# Patient Record
Sex: FEMALE | Race: WHITE | NOT HISPANIC OR LATINO | Employment: OTHER | URBAN - METROPOLITAN AREA
[De-identification: names, ages, dates, MRNs, and addresses within clinical notes are randomized per-mention and may not be internally consistent; named-entity substitution may affect disease eponyms.]

---

## 2017-01-12 ENCOUNTER — GENERIC CONVERSION - ENCOUNTER (OUTPATIENT)
Dept: OTHER | Facility: OTHER | Age: 62
End: 2017-01-12

## 2017-01-23 ENCOUNTER — GENERIC CONVERSION - ENCOUNTER (OUTPATIENT)
Dept: OTHER | Facility: OTHER | Age: 62
End: 2017-01-23

## 2017-02-24 ENCOUNTER — ALLSCRIPTS OFFICE VISIT (OUTPATIENT)
Dept: OTHER | Facility: OTHER | Age: 62
End: 2017-02-24

## 2017-05-16 ENCOUNTER — GENERIC CONVERSION - ENCOUNTER (OUTPATIENT)
Dept: OTHER | Facility: OTHER | Age: 62
End: 2017-05-16

## 2017-05-16 ENCOUNTER — ALLSCRIPTS OFFICE VISIT (OUTPATIENT)
Dept: OTHER | Facility: OTHER | Age: 62
End: 2017-05-16

## 2017-05-19 LAB
ADEQUACY: (HISTORICAL): NORMAL
CLINICIAN PROVIDIED ICD 9 OR 10 (HISTORICAL): NORMAL
COMMENT (HISTORICAL): NORMAL
DIAGNOSIS (HISTORICAL): NORMAL
HPV HIGH RISK (HISTORICAL): NEGATIVE
NOTE: (HISTORICAL): NORMAL
PERFORMED BY (HISTORICAL): NORMAL

## 2017-06-03 LAB
A/G RATIO (HISTORICAL): 1.9 (ref 1.2–2.2)
ALBUMIN SERPL BCP-MCNC: 4.4 G/DL (ref 3.6–4.8)
ALP SERPL-CCNC: 68 IU/L (ref 39–117)
ALT SERPL W P-5'-P-CCNC: 6 IU/L (ref 0–32)
AST SERPL W P-5'-P-CCNC: 13 IU/L (ref 0–40)
BASOPHILS # BLD AUTO: 0 X10E3/UL (ref 0–0.2)
BASOPHILS # BLD AUTO: 1 %
BILIRUB SERPL-MCNC: 0.8 MG/DL (ref 0–1.2)
BUN SERPL-MCNC: 3 MG/DL (ref 8–27)
BUN/CREA RATIO (HISTORICAL): 6 (ref 12–28)
CALCIUM SERPL-MCNC: 9.4 MG/DL (ref 8.7–10.3)
CHLORIDE SERPL-SCNC: 69 MMOL/L (ref 96–106)
CO2 SERPL-SCNC: 34 MMOL/L (ref 18–29)
CREAT SERPL-MCNC: 0.53 MG/DL (ref 0.57–1)
DEPRECATED RDW RBC AUTO: 15 % (ref 12.3–15.4)
EGFR AFRICAN AMERICAN (HISTORICAL): 119 ML/MIN/1.73
EGFR-AMERICAN CALC (HISTORICAL): 104 ML/MIN/1.73
EOSINOPHIL # BLD AUTO: 0 X10E3/UL (ref 0–0.4)
EOSINOPHIL # BLD AUTO: 1 %
GLUCOSE SERPL-MCNC: 100 MG/DL (ref 65–99)
HCT VFR BLD AUTO: 48.5 % (ref 34–46.6)
HEMATOLOGY COMMENT (HISTORICAL): ABNORMAL
HGB BLD-MCNC: 17.8 G/DL (ref 11.1–15.9)
IMM.GRANULOCYTES (CD4/8) (HISTORICAL): 0 %
IMM.GRANULOCYTES (CD4/8) (HISTORICAL): 0 X10E3/UL (ref 0–0.1)
LYMPHOCYTES # BLD AUTO: 1.5 X10E3/UL (ref 0.7–3.1)
LYMPHOCYTES # BLD AUTO: 25 %
MCH RBC QN AUTO: 32.3 PG (ref 26.6–33)
MCHC RBC AUTO-ENTMCNC: 36.7 G/DL (ref 31.5–35.7)
MCV RBC AUTO: 88 FL (ref 79–97)
MONOCYTES # BLD AUTO: 0.4 X10E3/UL (ref 0.1–0.9)
MONOCYTES (HISTORICAL): 7 %
NEUTROPHILS # BLD AUTO: 3.9 X10E3/UL (ref 1.4–7)
NEUTROPHILS # BLD AUTO: 66 %
PLATELET # BLD AUTO: 233 X10E3/UL (ref 150–379)
POTASSIUM SERPL-SCNC: 3.6 MMOL/L (ref 3.5–5.2)
RBC (HISTORICAL): 5.51 X10E6/UL (ref 3.77–5.28)
SODIUM SERPL-SCNC: 122 MMOL/L (ref 134–144)
TOT. GLOBULIN, SERUM (HISTORICAL): 2.3 G/DL (ref 1.5–4.5)
TOTAL PROTEIN (HISTORICAL): 6.7 G/DL (ref 6–8.5)
TSH SERPL DL<=0.05 MIU/L-ACNC: 1.51 UIU/ML (ref 0.45–4.5)
WBC # BLD AUTO: 5.9 X10E3/UL (ref 3.4–10.8)

## 2017-06-05 ENCOUNTER — GENERIC CONVERSION - ENCOUNTER (OUTPATIENT)
Dept: OTHER | Facility: OTHER | Age: 62
End: 2017-06-05

## 2017-06-14 ENCOUNTER — GENERIC CONVERSION - ENCOUNTER (OUTPATIENT)
Dept: OTHER | Facility: OTHER | Age: 62
End: 2017-06-14

## 2017-07-12 ENCOUNTER — GENERIC CONVERSION - ENCOUNTER (OUTPATIENT)
Dept: OTHER | Facility: OTHER | Age: 62
End: 2017-07-12

## 2017-07-12 DIAGNOSIS — Z12.9 ENCOUNTER FOR SCREENING FOR MALIGNANT NEOPLASM: ICD-10-CM

## 2017-07-12 DIAGNOSIS — Z85.3 PERSONAL HISTORY OF MALIGNANT NEOPLASM OF BREAST: ICD-10-CM

## 2017-07-19 LAB
A/G RATIO (HISTORICAL): 1.7 (ref 1.2–2.2)
ALBUMIN SERPL BCP-MCNC: 3.9 G/DL (ref 3.6–4.8)
ALP SERPL-CCNC: 64 IU/L (ref 39–117)
ALT SERPL W P-5'-P-CCNC: 9 IU/L (ref 0–32)
AST SERPL W P-5'-P-CCNC: 10 IU/L (ref 0–40)
BILIRUB SERPL-MCNC: 0.2 MG/DL (ref 0–1.2)
BUN SERPL-MCNC: 4 MG/DL (ref 8–27)
BUN/CREA RATIO (HISTORICAL): 9 (ref 12–28)
CALCIUM SERPL-MCNC: 9.5 MG/DL (ref 8.7–10.3)
CHLORIDE SERPL-SCNC: 87 MMOL/L (ref 96–106)
CO2 SERPL-SCNC: 30 MMOL/L (ref 18–29)
CREAT SERPL-MCNC: 0.44 MG/DL (ref 0.57–1)
EGFR AFRICAN AMERICAN (HISTORICAL): 127 ML/MIN/1.73
EGFR-AMERICAN CALC (HISTORICAL): 110 ML/MIN/1.73
GLUCOSE SERPL-MCNC: 93 MG/DL (ref 65–99)
POTASSIUM SERPL-SCNC: 4.1 MMOL/L (ref 3.5–5.2)
SODIUM SERPL-SCNC: 133 MMOL/L (ref 134–144)
TOT. GLOBULIN, SERUM (HISTORICAL): 2.3 G/DL (ref 1.5–4.5)
TOTAL PROTEIN (HISTORICAL): 6.2 G/DL (ref 6–8.5)

## 2017-08-09 ENCOUNTER — HOSPITAL ENCOUNTER (OUTPATIENT)
Dept: RADIOLOGY | Facility: HOSPITAL | Age: 62
Discharge: HOME | End: 2017-08-09
Payer: COMMERCIAL

## 2017-08-09 DIAGNOSIS — Z12.9 ENCOUNTER FOR SCREENING FOR MALIGNANT NEOPLASM: ICD-10-CM

## 2017-08-09 DIAGNOSIS — Z85.3 PERSONAL HISTORY OF MALIGNANT NEOPLASM OF BREAST: ICD-10-CM

## 2017-08-09 PROCEDURE — G0206 DX MAMMO INCL CAD UNI: HCPCS

## 2017-08-09 PROCEDURE — G0279 TOMOSYNTHESIS, MAMMO: HCPCS

## 2018-01-12 NOTE — RESULT NOTES
Verified Results  (1) Alejandro [id number] 10:09AM Felecia Lira     Test Name Result Flag Reference   Glucose, Serum 83 mg/dL  65-99   BUN 11 mg/dL  8-27   Creatinine, Serum 0 46 mg/dL L 0 57-1 00   eGFR If NonAfricn Am 108 mL/min/1 73  >59   eGFR If Africn Am 125 mL/min/1 73  >59   BUN/Creatinine Ratio 24  11-26   Sodium, Serum 135 mmol/L  134-144   **Effective October 17, 2016 the reference interval**                   for Sodium, Serum will be changing to:                                                             136 - 144   Potassium, Serum 4 1 mmol/L  3 5-5 2   **Effective October 17, 2016 the reference interval**                   for Potassium, Serum will be changing to:                                          0 -  7 days        3 7 - 5 2                                          8 - 30 days        3 7 - 6 4                                          1 -  6 months      3 8 - 6 0                                   7 months -  1 year        3 8 - 5 3                                              >1 year        3 5 - 5 2   Chloride, Serum 84 mmol/L L    **Effective October 17, 2016 the reference interval**                   for Chloride, Serum will be changing to:                                                              97 - 106   Carbon Dioxide, Total 35 mmol/L H 18-29   Calcium, Serum 9 4 mg/dL  8 7-10 3   Glucose, Serum 83 mg/dL  65-99   BUN 11 mg/dL  8-27   Creatinine, Serum 0 46 mg/dL L 0 57-1 00   eGFR If NonAfricn Am 108 mL/min/1 73  >59   eGFR If Africn Am 125 mL/min/1 73  >59   BUN/Creatinine Ratio 24  11-26   Sodium, Serum 135 mmol/L  134-144   **Effective October 17, 2016 the reference interval**                   for Sodium, Serum will be changing to:                                                             136 - 144   Potassium, Serum 4 1 mmol/L  3 5-5 2   **Effective October 17, 2016 the reference interval**                   for Potassium, Serum will be changing to: 0 -  7 days        3 7 - 5 2                                          8 - 30 days        3 7 - 6 4                                          1 -  6 months      3 8 - 6 0                                   7 months -  1 year        3 8 - 5 3                                              >1 year        3 5 - 5 2   Chloride, Serum 84 mmol/L L    **Effective October 17, 2016 the reference interval**                   for Chloride, Serum will be changing to:                                                              97 - 106   Carbon Dioxide, Total 35 mmol/L H 18-29   Calcium, Serum 9 4 mg/dL  8 7-10 3

## 2018-01-13 VITALS
DIASTOLIC BLOOD PRESSURE: 72 MMHG | BODY MASS INDEX: 24.49 KG/M2 | HEIGHT: 65 IN | WEIGHT: 147 LBS | SYSTOLIC BLOOD PRESSURE: 120 MMHG

## 2018-01-13 NOTE — MISCELLANEOUS
History of Present Illness  COPD Hospital Discharge Initial Follow-Up Call: The patient is being contacted for follow-up after hospitalization  The date of discharge is 10/21/16  I spoke with patient  Patient was identified as medium risk for readmission per risk stratification  The patient was discharged to home with [address]  The patient is a 1 PPD smoker  The patient is experiencing the following symptoms: no wheezing, no cough, no dyspnea, no fever and not coughing up sputum  The following topics were reviewed:  rescue vs  maintenance inhalers, COPD zone tool: when to take action, smoking cessation, DME [employer], energy conservation and physician follow-ups  Narrative Summary:  Patient discharged on 10/21  Has all medication  Taking only Prednisone for COPD  Discussed follow-up appts  She has [facility] scheduled and will make appt with Dr Sindy Roberson  She does not drive due to poor eyesight and hearing from a traumatic brain injury in 1999  She uses a taxi to go to [employer] and has friends who help around the house, laundry, groceries, etc  She is breathing without problems at this time but unfortunately still smokes  Discussed cessation at length  COPD [facility] team # given to patient  Current Meds    1  Levothyroxine Sodium 137 MCG Oral Tablet; TAKE 1 TABLET DAILY  Requested for:   [id number]; Last Rx:[id number] Ordered    2  Divalproex Sodium  MG Oral Tablet Extended Release 24 Hour (Depakote ER);   TAKE TABLET  2 tabs in the am, 2 tabs at noon, 1 tab at   night; Last Rx:[id number] Ordered    3  RisperDAL Consta 25 MG Intramuscular Suspension Reconstituted; INJECT 25 MG   INTRAMUSCULARLY EVERY 2 WEEKS; Therapy: (Recorded:[id number]) to Recorded   4  Torsemide 10 MG Oral Tablet; TAKE 1 TABLET DAILY; Therapy: (Recorded:04Oct2016) to Recorded    Allergies    1  Benzodiazepines   2  haloperidol   3  lithium   4  metoclopramide   5  PHENobarbital TABS   6  Thorazine TABS    7   Metal Compounds    Future Appointments    Date/Time Provider Specialty Site   10/31/2016 02:00 PM Lazarus Floras, MD Family Medicine [facility]     Signatures   Electronically signed by :  Kavya Rowe RT; Oct 24 2016  5:00PM EST                       (Author)

## 2018-01-14 NOTE — MISCELLANEOUS
Message  Message Free Text Note Form: Spoke with patient on the phone today to inform her that I have reviewed her BMP results and to let her know that her sodium is still low  I asked her if she has been restricting her fluids like we discussed at her previous visit and she says she has not been and she does not care to  Let her know that she will likely have to see a nephrologist, and pt denied due to transportation issues  After convincing her, she said that if I made an appointment for her, she'd consider going  Called Dr Praveen Deluca office and they said that they would call the patient directly once they have an appointment available  Signatures   Electronically signed by :  Scottie Christiansen MD; Nov 9 2016  8:36AM EST                       (Author)    Electronically signed by : GERALD Mckeon ; Jan 24 2017 12:00PM EST

## 2018-01-15 NOTE — RESULT NOTES
Message  CMP from 2017 reviewed and Na noted to be worse at 122  Patient has been historically difficult to manage due to non compliance with medical recommendations and fluid restriction  I doubt that she is following a fluid restriction  For now, will plan the followin  Call patient to see if she has any symptoms of dizziness, lightheadedness, nausea, headache, vomiting, poor balance  2  If she has symptoms, she will need to go to the hospital    3  If no symptoms, will have her start salt tablets 1 gm BID  4  She needs a fluid restriction of 50 oz in a day  5  Repeat BMP in 1 week          Results/Data     (1) COMPREHENSIVE METABOLIC PANEL   Glucose, Serum: 100 mg/dL Abnormal High Reference Range 65-99  BUN: 3 mg/dL Abnormal Low Reference Range 8-27  Creatinine, Serum: 0 53 mg/dL Abnormal Low Reference Range 0 57-1 00  BUN/Creatinine Ratio: 6  Abnormal Low Reference Range 12-28  Sodium, Serum: 122 mmol/L Abnormal Low Reference Range 134-144  Potassium, Serum: 3 6 mmol/L Reference Range 3 5-5 2  Chloride, Serum: 69 mmol/L Panic Low Reference Range   Carbon Dioxide, Total: 34 mmol/L Abnormal High Reference Range 18-29  Calcium, Serum: 9 4 mg/dL Reference Range 8 7-10 3  Protein, Total, Serum: 6 7 g/dL Reference Range 6 0-8 5  Albumin, Serum: 4 4 g/dL Reference Range 3 6-4 8  Globulin, Total: 2 3 g/dL Reference Range 1 5-4 5  A/G Ratio: 1 9  Reference Range 1 2-2 2  Bilirubin, Total: 0 8 mg/dL Reference Range 0 0-1 2  Alkaline Phosphatase, S: 68 IU/L Reference Range   AST (SGOT): 13 IU/L Reference Range 0-40  ALT (SGPT): 6 IU/L Reference Range 0-32  eGFR If NonAfricn Am: 104 mL/min/1 73 Reference Range >59  eGFR If Africn Am: 119 mL/min/1 73 Reference Range >59    Signatures   Electronically signed by : Paradise Maki MD; 2017 12:22PM EST                       (Author)

## 2018-01-15 NOTE — MISCELLANEOUS
Assessment    1  Tonic-clonic epileptic seizures (345 10) (G40 409)    Discussion/Summary  Discussion Summary:   Inderjit Steele is a 63 yo F who presents today for f/u after being hospitalized for seizure at [facility] from 17- 17  Pt was hyponatremic and hypokalemic on admission and was discharged after her electrolyte abnormalities were corrected  Pt has a follow up visit with her nephrologist Dr Pedro Pablo Rondon, and was told to make sure she does not miss this appointment  Also strongly advised patient to stick with her fluid restriction of 6 glasses a day only  She will also be following up with her psychiatrist at [facility] for Depakote management  Spoke with pt once again about smoking cessation and using home oxygen and she denied both  Chief Complaint  Chief Complaint Free Text Note Form: Discharged from [facility] 17 for seizure  JOSE completed  Tami Huang RN      History of Present Illness  TCM Communication St ke: [facility] records were reviewed  She was hospitalized at [facility]  The date of admission: 17, date of discharge: 17  Diagnosis: seizure  She was discharged to home  Medications reviewed and updated today  She did not schedule a follow up appointment  Follow-up appointments with other specialists: [facility]-Nephrologist  The patient is currently asymptomatic  Referrals Needed:  Pedro Pablo Rondon  Communication performed and completed by Christian Schofield RN   HPI: Inderjit Steele is a 63 yo F with PMH of schizoaffective disorder, traumatic brain injury, SIADH, COPD, and chronic seizure disorder who is here for follow up after she was hospitalized for seizure at [facility] from 17 to 17   As per patient, she has "telepathy" with her brother and claims that on  while at the [facility], she experienced a sudden telepathic feeling that her brother had  (even though he actually did not) and preceded to have a seizure  Pt was in a postictal state on arrival to the hospital  A CT Scan of the head, EEG and CXR were done which were all normal  Blood work revealed hyponatremia with Na+ level of 122 and K+ level of 2 4  Pt was admitted for correction of electrolyte abnormalities and monitoring for further seizure activity  Pt follows outpatient with nephrologist, Dr Megan Lei, who advised frequent BMP checks and fluid restriction, but she has not been following her fluid restriction  She has been taking her medications, including Torsemide regularly  Pt continues to smoke and refuse oxygen therapy for her chronic hypoxic respiratory failure 2/2 COPD  She continues to see doctor at [facility] for her Depakote management  Review of Systems  Complete-Female:   Constitutional: No fever, no chills, feels well, no tiredness, no recent weight gain or weight loss  Eyes: No complaints of eye pain, no red eyes, no eyesight problems, no discharge, no dry eyes, no itching of eyes  ENT: no complaints of earache, no loss of hearing, no nose bleeds, no nasal discharge, no sore throat, no hoarseness  Respiratory: No complaints of shortness of breath, no wheezing, no cough, no SOB on exertion, no orthopnea, no PND  Gastrointestinal: No complaints of abdominal pain, no constipation, no nausea or vomiting, no diarrhea, no bloody stools  Musculoskeletal: No complaints of arthralgias, no myalgias, no joint swelling or stiffness, no limb pain or swelling  Integumentary: No complaints of skin rash or lesions, no itching, no skin wounds, no breast pain or lump  Neurological: No complaints of headache, no confusion, no convulsions, no numbness, no dizziness or fainting, no tingling, no limb weakness, no difficulty walking  Active Problems    1  COPD (chronic obstructive pulmonary disease) (496) (J44 9)   2  Diabetes mellitus screening (V77 1) (Z13 1)   3  Hyperlipidemia (272 4) (E78 5)   4  Hyponatremia (276 1) (E87 1)   5  Hypothyroidism (244 9) (E03 9)   6  Mental impairment (319) (F79)   7  Pulmonary nodule (793 11) (R91 1)   8  SIADH (syndrome of inappropriate ADH production) (253 6) (E22 2)   9  SOB (shortness of breath) (786 05) (R06 02)   10  Tonic-clonic epileptic seizures (345 10) (G40 409)    Past Medical History    1  History of COPD exacerbation (491 21) (J44 1)   2  History of Encounter for screening mammogram for malignant neoplasm of breast   (V76 12) (Z12 31)   3  History of Encounter for screening mammogram for malignant neoplasm of breast   (V76 12) (Z12 31)   4  History of Guidance: Concerns About Tobacco Use (V65 42)   5  History of left breast cancer (V10 3) (Z85 3)   6  History of Well woman exam with routine gynecological exam (V72 31) (Z01 419)    Surgical History    1  History of Breast Surgery Mastectomy   2  History of Tonsillectomy    Family History  Mother    1  Family history of Bone cancer  Father    2  Family history of malignant neoplasm of stomach (V16 0) (Z80 0)    Social History    · History of Current Every Day Smoker (305 1)   · Current smoker (305 1) (F17 200)    Current Meds   1  Divalproex Sodium 500 MG Oral Tablet Delayed Release; TAKE 1 TABLET BY MOUTH   WITH BREAKFAST AND TAKE 1 TABLET WITH LUNCH;   Therapy: 70FME8815 to (Evaluate:31Mar2017)  Requested for: 61YDB6617; Last   Rx:01Nov2016 Ordered   2  Levothyroxine Sodium 137 MCG Oral Tablet; TAKE 1 TABLET DAILY  Requested for:   52RAM0817; Last Rx:[id number] Ordered   3  RisperDAL Consta 25 MG Intramuscular Suspension Reconstituted; INJECT 25 MG   INTRAMUSCULARLY EVERY 2 WEEKS; Therapy: (Recorded:04Oct2016) to Recorded   4  Torsemide 20 MG Oral Tablet; Take 1 tablet daily; Therapy: 57ZAM6294 to (Evaluate:79Iic9659)  Requested for: 22Nov2016; Last   Rx:21Nov2016 Ordered    Allergies    1  Benzodiazepines   2  haloperidol   3  lithium   4  metoclopramide   5  PHENobarbital TABS   6  Thorazine TABS    7  Metal Compounds    Physical Exam    Constitutional   General appearance: No acute distress, well appearing and well nourished  Eyes   Conjunctiva and lids: No swelling, erythema or discharge  Pupils and irises: Equal, round and reactive to light  Pulmonary   Respiratory effort: No increased work of breathing or signs of respiratory distress  Auscultation of lungs: Abnormal   Auscultation of the lungs revealed diffuse wheezing  diffuse rhonchi bilaterally  Cardiovascular   Palpation of heart: Normal PMI, no thrills  Auscultation of heart: Normal rate and rhythm, normal S1 and S2, without murmurs  Examination of extremities for edema and/or varicosities: Normal     Musculoskeletal   Gait and station: Normal     Inspection/palpation of joints, bones, and muscles: Normal     Neurologic   Cranial nerves: Cranial nerves 2-12 intact  Reflexes: 2+ and symmetric  Sensation: No sensory loss  Psychiatric   Orientation to person, place, and time: Normal     Mood and affect: Normal          Health Management  History of Encounter for screening mammogram for malignant neoplasm of breast   Digital Bilateral Screening Mammogram With CAD; every 1 year; Last [id number]; Next Due:  60Gok8169; Overdue    Attending Note  Attending Note: Attending Note: I did not interview and examine the patient, I supervised the Resident and I agree with the Resident management plan as it was presented to me  Level of Participation: I was present in clinic, but did not examine the patient  Comments/Additional Findings: Dx: although patient has a past history of epileptic seizures, her recent seizure is most likely related to her electrolyte imbalance secondary to non-compliance with SIADH related fluid restriction  Pt  strongly advised by resident physician to adhere to fluid restriction, and she will be following up with nephrologist soon  I agree with the Resident's note        Future Appointments    Date/Time Provider Specialty Site   02/24/2017 02:30 PM Mauro Steiner MD Nephrology [address]     Signatures   Electronically signed by : Seb Son MD; Jan 23 2017  6:58PM EST                       (Author)    Electronically signed by :  GERALD Chisholm ; Jan 24 2017 10:36AM EST                       (Co-author)

## 2018-01-15 NOTE — MISCELLANEOUS
Assessment    1  Current smoker (305 1) (F17 200)   2  History of COPD exacerbation (491 21) (J44 1)   3  History of Breast Surgery Mastectomy   4  History of Tonsillectomy   5  Hyponatremia (276 1) (E87 1)   6  COPD (chronic obstructive pulmonary disease) (496) (J44 9)   7  Pulmonary nodule (793 11) (R91 1)    Plan  Pulmonary nodule    · * CT CHEST HIGH RESOLUTION; Status:Need Information - Financial Authorization; Requested UTK:[id number];    Perform:[facility] Radiology; LEXX:[id number];JIFBWPX; For:Pulmonary nodule; Ordered By:Eleonora Winkler; Discussion/Summary  Discussion Summary:   Nevaeh Jeronimo is a 63 yo F who comes into today for follow up after hospitalization at [facility] for hyponatremia and COPD exacerbation    - I stressed to patient the importance of using her home O2 regularly  When she first arrived to the office today, her O2 sat was 87, but on second check, without O2 use, it was 92  Pt says she does not use home O2 regularly but will start now  She said she will contact the company that delivered her oxygen tanks and ask for more since she initially refused all but 1 tank due to space concerns at home  She has been compliant with her medications  - Pt has an appointment with [facility] Sh[facility] RN) on Tuesday where her Depakote dose will be re-evaluated/ level will be checked  - Script given for chest CT scan that is to be done in 3 months from today for suspicious lesion seen on chest CT during hospital stay  - Pt will follow up at [facility] in 3 months, after getting chest CT done  - Pt refuses flu and pneumo shots today  Chief Complaint  Chief Complaint Free Text Note Form: Admitted to [facility] 9/28/16-10/3/16 for hyponatremia and pneumonia  Follow up appointment scheduled for 10/7/16 at 1 pm with Dr Kezia Almodovar RN      History of Present Illness  TCM Communication [facility]: The patient is being contacted for follow-up after hospitalization  She was hospitalized at [facility]  The date of admission: 09/28/2016, date of discharge: 10/3/2016  Diagnosis: hyponatremia  She was discharged to home  Medications reviewed and updated today  She did not schedule a follow up appointment  Follow-up appointments with other specialists: none  The patient is currently asymptomatic  Referrals Needed:  none  Communication performed and completed by Tish North RN   HPI: Kristi Evans is a 63 yo F here today for follow up after being discharged from [address] [place] [address] on 10/3/16 s/p treatment of COPD exacerbation and hyponatremia  Pt said that someone came to her house to deliver multiple oxygen tanks which were too big to fit in her bedroom, so she sent all but 1 of them back  She has not been using her home oxygen regularly because she said she has no trouble breathing and the weather is nice  She finished her course of antibiotics given during her hospital stay  Pt has been restricting her fluids to a little over 1 L daily  She has been taking her solumedrol pack regularly and has 3 more days left of it including today  She has been taking torsemide every morning  Pt lost over 20 lbs in the past 10 days due to fluid loss in her body, she no longer has edema anywhere  Pt no longer has constipation either  She still smokes, but has cut down to 3x/daily  Pt said she has an appointment with [facility] RN) on Tuesday where her Depakote dose will be re-evaluated/ level will be checked  Script given for chest CT scan that is to be done in 3 months from today for suspicious lesion seen on chest CT during hospital stay  Pt refuses flu and pneumococcus shots today  Review of Systems  Complete-Female:   Constitutional: recent 20 lbs in 10 days  lb weight loss, but no fever, not feeling poorly, no recent weight gain, no chills and not feeling tired     Eyes: eyesight problems, but no eye pain, no dryness of the eyes, eyes not red, no purulent discharge from the eyes and no itching of the eyes  ENT: no earache, no nosebleeds, no sore throat, no nasal discharge and no hoarseness    The patient presents with complaints of gradual onset of moderate left ear hearing loss  Cardiovascular: No complaints of slow heart rate, no fast heart rate, no chest pain, no palpitations, no leg claudication, no lower extremity edema  Respiratory: No complaints of shortness of breath, no wheezing, no cough, no SOB on exertion, no orthopnea, no PND  Gastrointestinal: No complaints of abdominal pain, no constipation, no nausea or vomiting, no diarrhea, no bloody stools  Genitourinary: No complaints of dysuria, no incontinence, no pelvic pain, no dysmenorrhea, no vaginal discharge or bleeding  Musculoskeletal: No complaints of arthralgias, no myalgias, no joint swelling or stiffness, no limb pain or swelling  Integumentary: No complaints of skin rash or lesions, no itching, no skin wounds, no breast pain or lump  Neurological: No complaints of headache, no confusion, no convulsions, no numbness, no dizziness or fainting, no tingling, no limb weakness, no difficulty walking  Psychiatric: emotional problems, but not suicidal, no anxiety, no personality change, no sleep disturbances and no depression  Other Symptoms: Patient has resting and intention tremor  Active Problems    1  Diabetes mellitus screening (V77 1) (Z13 1)   2  Hyperlipidemia (272 4) (E78 5)   3  Hyponatremia (276 1) (E87 1)   4  Hypothyroidism (244 9) (E03 9)   5  SOB (shortness of breath) (786 05) (R06 02)   6  Tonic-clonic epileptic seizures (345 10) (G40 409)    Past Medical History    1  History of COPD exacerbation (491 21) (J44 1)   2  History of Encounter for screening mammogram for malignant neoplasm of breast   (V76 12) (Z12 31)   3  History of Encounter for screening mammogram for malignant neoplasm of breast   (V76 12) (Z12 31)   4   History of Guidance: Concerns About Tobacco Use (V65 42)   5  History of left breast cancer (V10 3) (Z85 3)   6  History of Well woman exam with routine gynecological exam (V72 31) (Z01 419)    Surgical History    1  History of Breast Surgery Mastectomy   2  History of Tonsillectomy    Family History  Mother    1  Family history of Bone cancer  Father    2  Family history of malignant neoplasm of stomach (V16 0) (Z80 0)    Social History    · History of Current Every Day Smoker (305 1)    Current Meds   1  Divalproex Sodium  MG Oral Tablet Extended Release 24 Hour; TAKE TABLET  2   tabs in the am, 2 tabs at noon, 1 tab at night; Last Rx:16Jun2014 Ordered   2  Levothyroxine Sodium 137 MCG Oral Tablet; TAKE 1 TABLET DAILY  Requested for:   70Pma8901; Last Rx:44Nki3233 Ordered   3  RisperDAL Consta 25 MG Intramuscular Suspension Reconstituted; INJECT 25 MG   INTRAMUSCULARLY EVERY 2 WEEKS; Therapy: (Recorded:04Oct2016) to Recorded   4  Torsemide 10 MG Oral Tablet; TAKE 1 TABLET DAILY; Therapy: (Recorded:04Oct2016) to Recorded    Allergies    1  Benzodiazepines   2  haloperidol   3  lithium   4  metoclopramide   5  PHENobarbital TABS   6  Thorazine TABS    7  Metal Compounds    Vitals  Signs   Recorded: 10YWD3943 17:32SM   Systolic: 809  Diastolic: 52  Heart Rate: 99  Respiration: 20  Temperature: 98 F  Pain Scale: 0  O2 Saturation: 87  Height: 5 ft 5 in  Weight: 161 lb 8 oz  BMI Calculated: 26 88  BSA Calculated: 1 81    Physical Exam    Constitutional   General appearance: No acute distress, well appearing and well nourished  Eyes   Conjunctiva and lids: No swelling, erythema or discharge  Pupils and irises: Equal, round and reactive to light  Ears, Nose, Mouth, and Throat   External inspection of ears and nose: Normal     Otoscopic examination: Tympanic membranes translucent with normal light reflex  Canals patent without erythema  Nasal mucosa, septum, and turbinates: Normal without edema or erythema      Oropharynx: Normal with no erythema, edema, exudate or lesions  Pulmonary   Respiratory effort: No increased work of breathing or signs of respiratory distress  Auscultation of lungs: Abnormal   Diffuse wheezing heard bilaterally  Cardiovascular   Palpation of heart: Normal PMI, no thrills  Auscultation of heart: Normal rate and rhythm, normal S1 and S2, without murmurs  Examination of extremities for edema and/or varicosities: Normal     Abdomen   Abdomen: Non-tender, no masses  Musculoskeletal   Gait and station: Normal     Psychiatric   Orientation to person, place, and time: Normal     Mood and affect: Normal          Health Management  History of Encounter for screening mammogram for malignant neoplasm of breast   Digital Bilateral Screening Mammogram With CAD; every 1 year; Last 84SGL9569; Next Due:  54Vzb0689; Overdue    Message   Recorded as Task   Date: 10/03/2016 06:39 PM, Created By: System   Task Name: [facility]   Assigned To: Sunday Rivers   Regarding Patient: Janelle Martin, Status: Active   Comment:    System - 03 Oct 2016 6:39 PM     Patient discharged from hospital   Patient Name: Nadia Brewer  Patient YOB: 1955  Discharge Date: 10/3/2016  [facility] 04 Oct 2016 8:05 AM     TASK EDITED     Signatures   Electronically signed by : Suzanne Chang MD; Oct  4 2016  8:56PM EST                       (Author)    Electronically signed by :  Suzanne Chang MD; Oct  8 2016  1:11PM EST                       (Author)    Electronically signed by : PRIMO Luna ; Oct 10 2016 10:45AM EST                       (Author)

## 2018-01-16 NOTE — MISCELLANEOUS
Message  Pt called today stating that since decreasing the Torsemide she has bilateral LE edema and is difficult for her to move,per Boris Mention pt to increase Torsemide to 20 mg qd and have blood work in two weeks,pt aware of the above ,med called into the pharmacy  Active Problems    1  COPD (chronic obstructive pulmonary disease) (496) (J44 9)   2  Diabetes mellitus screening (V77 1) (Z13 1)   3  Hyperlipidemia (272 4) (E78 5)   4  Hyponatremia (276 1) (E87 1)   5  Hypothyroidism (244 9) (E03 9)   6  Mental impairment (319) (F79)   7  Pulmonary nodule (793 11) (R91 1)   8  SIADH (syndrome of inappropriate ADH production) (253 6) (E22 2)   9  SOB (shortness of breath) (786 05) (R06 02)   10  Tonic-clonic epileptic seizures (345 10) (G40 409)    Current Meds   1  Divalproex Sodium 500 MG Oral Tablet Delayed Release; TAKE 1 TABLET BY MOUTH   WITH BREAKFAST AND TAKE 1 TABLET WITH LUNCH;   Therapy: 24UAQ3215 to (Evaluate:31Mar2017)  Requested for: 84TKU0506; Last   Rx:01Nov2016 Ordered   2  Levothyroxine Sodium 137 MCG Oral Tablet; TAKE 1 TABLET DAILY  Requested for:   73Ese3789; Last Rx:91Cek6522 Ordered   3  RisperDAL Consta 25 MG Intramuscular Suspension Reconstituted; INJECT 25 MG   INTRAMUSCULARLY EVERY 2 WEEKS; Therapy: (Recorded:04Oct2016) to Recorded   4  Torsemide 20 MG Oral Tablet; take 1 tablet by mouth every day; Therapy: 79XBT8246 to (Evaluate:15Goj4423)  Requested for: 17TFH8237; Last   Rx:16Nov2016 Ordered    Allergies    1  Benzodiazepines   2  haloperidol   3  lithium   4  metoclopramide   5  PHENobarbital TABS   6  Thorazine TABS    7  Metal Compounds    Plan  SIADH (syndrome of inappropriate ADH production)    · Torsemide 20 MG Oral Tablet;  Take 1 tablet daily    Signatures   Electronically signed by : Praveen Corona, ; Nov 21 2016  2:39PM EST                       (Author)

## 2018-01-16 NOTE — RESULT NOTES
Verified Results  (1) BASIC METABOLIC PROFILE 91TYK2837 07:33AM Shweta Frazier courtesy copy of this report has been sent to  [phone]  Test Name Result Flag Reference   Glucose, Serum 105 mg/dL H 65-99   BUN 3 mg/dL L 8-27   **Verified by repeat analysis**   Creatinine, Serum 0 47 mg/dL L 0 57-1 00   eGFR If NonAfricn Am 108 mL/min/1 73  >59   eGFR If Africn Am 124 mL/min/1 73  >59   BUN/Creatinine Ratio 6 L 11-26   Sodium, Serum 129 mmol/L L 136-144   **Verified by repeat analysis**   Potassium, Serum 4 9 mmol/L  3 5-5 2   Chloride, Serum 76 mmol/L L    **Verified by repeat analysis**   Carbon Dioxide, Total 39 mmol/L H 18-29   **Verified by repeat analysis**   Calcium, Serum 9 5 mg/dL  8 7-10 3     [facility] Default [id number] 07:33AM Ruby Jackson     Test Name Result Flag Reference   [facility] Default Comment     A hand-written panel/profile was received from your office  In  accordance with the [facility] Ambiguous Test Code Policy dated July 0911, we have completed your order by using the closest currently  or formerly recognized AMA panel  We have assigned Basic Metabolic  Panel (8), Test Code #700424 to this request  If this is not the  testing you wished to receive on this specimen, please contact the  [address] [place] [address] Client Inquiry/Technical Services Department to clarify the  test order  We appreciate your business

## 2018-01-16 NOTE — MISCELLANEOUS
Message  Message Free Text Note Form: Had a long discussion with patient about her blood work, stating that her sodium is very low and worsening  Pt states that she doesn't have the money or ride to come to [place] to discuss lab results or to see nephrologist    Giovany Rodriguez her nephrologist, Dr Cole Rodriguez note who suggested salt tablets, fluid restriction (50 oz daily), and repeat BMP  Discussed this with pt who states that she refuses salt tablets, she does not want to restrict her fluids and she cannot come into the the office to  script for BMP  Discussed option of mailing script to her and she said she will consider going to get the BMP  Pt is non-compliant and very confused  Talked with her about home help and she states that people from [facility] come 3 times a week for a few hours to help her out  States she will consider long term nursing home or VNA services  At this time, it is unlikely pt will follow medical advise  Let her know that if she has any symptoms such as dizziness, light headedness, loss of balance, N/V/D/C/F/C she should go to the ER for evaluation immediately  Discussed risks of medication non compliance and pt expresses understanding, continues to refuse  Signatures   Electronically signed by :  Mary Caldwell MD; Jun 14 2017  6:53PM EST                       (Author)    Electronically signed by : GERALD Waggoner ; Jul 25 2017 12:23PM EST

## 2018-01-17 NOTE — MISCELLANEOUS
Message  Phone call to patient to advise that she needs an appointment here at the office as her sodium level is low  Patient refused an appointment this week to be evaluated for the low sodium  Blood work results were faxed to patient's Nephrologist, Dr Cyril Butts RN      Active Problems    1  COPD (chronic obstructive pulmonary disease) (496) (J44 9)   2  Diabetes mellitus screening (V77 1) (Z13 1)   3  Hyperlipidemia (272 4) (E78 5)   4  Hyponatremia (276 1) (E87 1)   5  Hypothyroidism (244 9) (E03 9)   6  Mental impairment (319) (F79)   7  Pulmonary nodule (793 11) (R91 1)   8  Screening for cancer (V76 9) (Z12 9)   9  SIADH (syndrome of inappropriate ADH production) (253 6) (E22 2)   10  SOB (shortness of breath) (786 05) (R06 02)   11  Tonic-clonic epileptic seizures (345 10) (G40 409)    Current Meds   1  Depakote 500 MG Oral Tablet Delayed Release; Take 1 tablet daily; Therapy: (Recorded:12Jan2017) to Recorded   2  Levothyroxine Sodium 137 MCG Oral Tablet; take 1 tablet every day; Therapy: 00Nhy6055 to (Evaluate:61Oyz9605)  Requested for: 19Apr2017; Last   Rx:19Apr2017 Ordered   3  RisperiDONE 2 MG Oral Tablet; TAKE 1 TABLET AT BEDTIME; Therapy: (Recorded:12Jan2017) to Recorded   4  Torsemide 20 MG Oral Tablet; Take 1 tablet daily; Therapy: 23SKA0993 to (Evaluate:75Pjp5076)  Requested for: 22Nov2016; Last   Rx:21Nov2016 Ordered    Allergies    1  Benzodiazepines   2  haloperidol   3  lithium   4  metoclopramide   5  PHENobarbital TABS   6  Thorazine TABS    7   Metal Compounds    Signatures   Electronically signed by : Priscille Seip, M D ; Jun 5 2017 11:18AM EST                       (Co-author)

## 2018-01-18 NOTE — PROGRESS NOTES
Assessment    1  Encounter for preventive health examination (V70 0) (Z00 00)   2  COPD (chronic obstructive pulmonary disease) (496) (J44 9)   3  Hyponatremia (276 1) (E87 1)   4  Hypothyroidism (244 9) (E03 9)   5  SIADH (syndrome of inappropriate ADH production) (253 6) (E22 2)    Plan  Hypothyroidism    · (1) TSH; Status:Active; Requested for:24Ghj5118;   Screening for cancer    · (1) THIN PREP PAP WITH IMAGING; Status:Active; Requested for:08Cvx7609; Maturation index required? : No  HPV? : Regardless of Interpretation  SIADH (syndrome of inappropriate ADH production)    · (1) CBC/PLT/DIFF; Status:Active; Requested for:45Mwn2485;    · (1) COMPREHENSIVE METABOLIC PANEL; Status:Active; Requested for:97Vst9929;     Discussion/Summary  health maintenance visit Currently, she eats an adequate diet, has an inadequate exercise regimen and Let pt know she should increase intake of fruits and vegetables  Pap test was done today Breast cancer screening: mammogram is current  Colorectal cancer screening: the patient declines colorectal cancer screening  Osteoporosis screening: bone mineral density testing is not indicated  Screening lab work includes hemoglobin, glucose and thyroid function testing  The immunizations are up to date  Advice and education were given regarding nutrition and tobacco cessation  Patient discussion: discussed with the patient  This is a 61 yo F who presents today for health maintenance and gyn visit  Pt has hx of:  # COPD: Discussed with pt in detail about smoking cessation  She continues to refuse home oxygen treatment and smoking cessation  Explained to her that her lungs exam revealed diffuse wheezing and rhonchi but she states she is breathing fine and doesn't want to do anything else  # SIADH: Let pt know that she has to follow up with Dr Ena Hughes, her nephrologist  She states that "he wants to take out my kidney so i refuse to see him again"   Explained the importance of seeing a nephrologist due to her history of very low sodium  She has not been restricting her fluids appropriately, stating she drinks at least 3L of fluids per day when she is limited to only 6 glasses per Dr Alice Erazo  When asked why she states "my sodium is fine, I'm going to keep drinking " Pt continues to refuse repeat CT scan of her chest  Gave pt script for blood work  # Mental Impairment and Hx of Seizures: 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 which makes it difficult for her to process information  Pt states today she needs script for Depakote level to be checked, but I let her know that she should follow up with her psychiatrist at [facility] for this since they are managing her Depakote levels  # Hypothyroidism: Ordered TSH today  The patient was counseled regarding risk factor reductions, patient and family education, risks and benefits of treatment options, importance of compliance with treatment  Chief Complaint  patient here for CPE and PAP      History of Present Illness  HM, Adult Female: The patient is being seen for a health maintenance and gynecology evaluation  The last health maintenance visit was 2 year(s) ago  Social History: Household members include Lives alone  She is unmarried  Work status: currently on disability  The patient is a current cigarette smoker  She Smokes half pack every 2 days since she was 10years old  She is not ready to quit using tobacco  She reports never drinking alcohol  She has never used illicit drugs  General Health: The patient's health since the last visit is described as fair  She has regular dental visits  The patient brushes 1 time(s) a day, doesn't floss her teeth and reports her last dental visit was 1 year ago  She complains of vision problems  Vision care includes wearing glasses and having eye examinations 1 times per year  She has hearing loss  Immunizations status: up to date  Lifestyle:   She consumes a diverse and healthy diet  Dietary details include 0 servings of fruit per day, 1 servings of vegetables per day, 1 servings of meat per day and Drinks 2L of water daily and 1L of juices/soda/coffee daily  She does not exercise regularly  She uses tobacco  She denies alcohol use  She denies drug use  Reproductive health: the patient is postmenopausal   she is not sexually active  pregnancy history: G 0P 0  Screening: Cervical cancer screening includes a pap smear performed 3 years ago  Breast cancer screening includes a mammogram performed last year  Colorectal cancer screening includes Pt refuses colorectal cancer screening  Metabolic screening includes lipid profile performed within the past five years, glucose screening performed last year, thyroid function test performed last year and uncertain timing of her last DEXA  Cardiovascular risk factors: tobacco use and sedentary lifestyle, but no hypertension, no diabetes, no high LDL cholesterol, no low HDL cholesterol, no stress, no obesity, no illicit drug use and no family history of cardiovascular disease  Safety elements used: seat belt, sunscreen, smoke detector and carbon monoxide detector  Review of Systems    Constitutional: no fever, not feeling poorly and no chills  Eyes: eyesight problems, but no eye pain, eyes not red and no purulent discharge from the eyes  ENT: hearing loss, but no earache, no nosebleeds, no sore throat, no nasal discharge and no hoarseness  Cardiovascular: lower extremity edema, but no chest pain and no palpitations  Respiratory: no shortness of breath, no cough, no wheezing and no shortness of breath during exertion  Gastrointestinal: no abdominal pain, no nausea, no vomiting, no constipation, no diarrhea and no blood in stools  Genitourinary: no dysuria  Musculoskeletal: no arthralgias and no myalgias  Integumentary: no rashes and no skin lesions     Neurological: confusion, but no headache, no numbness, no tingling and no dizziness  Active Problems    1  COPD (chronic obstructive pulmonary disease) (496) (J44 9)   2  Diabetes mellitus screening (V77 1) (Z13 1)   3  Hyperlipidemia (272 4) (E78 5)   4  Hyponatremia (276 1) (E87 1)   5  Hypothyroidism (244 9) (E03 9)   6  Mental impairment (319) (F79)   7  Pulmonary nodule (793 11) (R91 1)   8  SIADH (syndrome of inappropriate ADH production) (253 6) (E22 2)   9  SOB (shortness of breath) (786 05) (R06 02)   10  Tonic-clonic epileptic seizures (345 10) (G40 409)    Past Medical History    · History of COPD exacerbation (491 21) (J44 1)   · History of Encounter for screening mammogram for malignant neoplasm of breast  (V76 12) (Z12 31)   · History of Encounter for screening mammogram for malignant neoplasm of breast  (V76 12) (Z12 31)   · History of Guidance: Concerns About Tobacco Use (V65 42)   · History of left breast cancer (V10 3) (Z85 3)   · History of Well woman exam with routine gynecological exam (V72 31) (Z01 419)    Surgical History    · History of Breast Surgery Mastectomy   · History of Tonsillectomy    Family History  Mother    · Family history of Bone cancer  Father    · Family history of malignant neoplasm of stomach (V16 0) (Z80 0)    Social History    · History of Current Every Day Smoker (305 1)   · Current smoker (305 1) (F17 200)    Current Meds   1  Depakote 500 MG Oral Tablet Delayed Release; Take 1 tablet daily; Therapy: (Recorded:12Jan2017) to Recorded   2  Levothyroxine Sodium 137 MCG Oral Tablet; take 1 tablet every day; Therapy: 01Uuy0375 to (Evaluate:66Obg8684)  Requested for: 19Apr2017; Last   Rx:19Apr2017 Ordered   3  RisperiDONE 2 MG Oral Tablet; TAKE 1 TABLET AT BEDTIME; Therapy: (Recorded:12Jan2017) to Recorded   4  Torsemide 20 MG Oral Tablet; Take 1 tablet daily; Therapy: 67HLI5361 to (Evaluate:42Cjh9191)  Requested for: 22Nov2016; Last   Rx:21Nov2016 Ordered    Allergies    1  Benzodiazepines   2  haloperidol   3  lithium   4  metoclopramide   5  PHENobarbital TABS   6  Thorazine TABS    7  Metal Compounds    Vitals   Recorded: 72SJR0724 01:04PM   Temperature 97 9 F   Heart Rate 106   Respiration 18   Systolic 929, LUE, Sitting   Diastolic 72, LUE, Sitting   Height 5 ft 5 in   Weight 140 lb    BMI Calculated 23 3   BSA Calculated 1 7   O2 Saturation 85     Physical Exam    Constitutional Slightly disheveled  Head and Face   Head and face: Normal     Palpation of the face and sinuses: No sinus tenderness  Eyes   Conjunctiva and lids: No swelling, erythema or discharge  Pupils and irises: Equal, round, reactive to light  Ophthalmoscopic examination: Normal fundi and optic discs  Ears, Nose, Mouth, and Throat   External inspection of ears and nose: Normal     Otoscopic examination: Tympanic membranes translucent with normal light reflex  Canals patent without erythema  Hearing: Normal     Nasal mucosa, septum, and turbinates: Normal without edema or erythema  Lips, teeth, and gums: Normal, good dentition  Thrush  Neck   Neck: Supple, symmetric, trachea midline, no masses  Thyroid: Normal, no thyromegaly  Pulmonary   Respiratory effort: No increased work of breathing or signs of respiratory distress  Palpation of chest: Normal     Auscultation of lungs: Abnormal   diffuse rales/crackles bilaterally  diffuse rhonchi bilaterally  diffuse wheezing bilaterally  Cardiovascular   Palpation of heart: Normal PMI, no thrills  Examination of extremities for edema and/or varicosities: Abnormal   2+ pitting edema in lower extremities bilaterally  Chest   Breasts: Abnormal   Right breast: no mastectomy  Left breast: total mastectomy  Palpation of breasts and axillae: Normal, no masses palpated  Abdomen   Abdomen: Non-tender, no masses  Genitourinary   External genitalia and vagina: Normal, no lesions appreciated  Urethra: Normal, no discharge  Bladder: Not distended, no tenderness      Cervix: Normal, no lesions  Lymphatic   Palpation of lymph nodes in neck: No lymphadenopathy  Musculoskeletal   Gait and station: Abnormal   Intention tremor present  Digits and nails: Normal without clubbing or cyanosis  Range of motion: Normal     Muscle strength/tone: Normal     Skin   Skin and subcutaneous tissue: Normal without rashes or lesions  Palpation of skin and subcutaneous tissue: Normal turgor  Psychiatric   Judgment and insight: Normal     Orientation to person, place, and time: Normal     Mood and affect: Normal        Health Management  History of Encounter for screening mammogram for malignant neoplasm of breast   Digital Bilateral Screening Mammogram With CAD; every 1 year; Last [id number]; Next  Due: 62Jdx8920; Overdue    Signatures   Electronically signed by :  Taco Levya MD; May 19 2017  8:46AM EST                       (Author)    Electronically signed by : GERALD Knutson ; Jul 25 2017 12:23PM EST

## 2018-01-22 VITALS
SYSTOLIC BLOOD PRESSURE: 128 MMHG | OXYGEN SATURATION: 85 % | BODY MASS INDEX: 23.32 KG/M2 | RESPIRATION RATE: 18 BRPM | TEMPERATURE: 97.9 F | HEIGHT: 65 IN | DIASTOLIC BLOOD PRESSURE: 72 MMHG | HEART RATE: 106 BPM | WEIGHT: 140 LBS

## 2018-01-22 VITALS
TEMPERATURE: 97.5 F | SYSTOLIC BLOOD PRESSURE: 110 MMHG | BODY MASS INDEX: 26.63 KG/M2 | WEIGHT: 160 LBS | DIASTOLIC BLOOD PRESSURE: 70 MMHG

## 2022-04-18 ENCOUNTER — TELEPHONE (OUTPATIENT)
Dept: FAMILY MEDICINE CLINIC | Facility: CLINIC | Age: 67
End: 2022-04-18

## 2022-04-18 NOTE — TELEPHONE ENCOUNTER
Patient Attribution- Called to schedule an appt  Patient has not been in office since 2017  Ph# disconnected  Ph# listed is also same ph# as "friend" listed as emerg contact  Mailing certified letter of attribution       [facility]- please remove Dr Palmira Miller as PCP

## 2022-06-24 NOTE — TELEPHONE ENCOUNTER
06/24/22 12:39 PM     Thank you for your request  Your request has been received, reviewed, and the patient chart updated  The PCP has successfully been removed with a patient attribution note  This message will now be completed      Thank you  Gigi Damon